# Patient Record
Sex: FEMALE | Race: OTHER | HISPANIC OR LATINO | Employment: FULL TIME | URBAN - METROPOLITAN AREA
[De-identification: names, ages, dates, MRNs, and addresses within clinical notes are randomized per-mention and may not be internally consistent; named-entity substitution may affect disease eponyms.]

---

## 2019-11-03 ENCOUNTER — OFFICE VISIT (OUTPATIENT)
Dept: URGENT CARE | Facility: CLINIC | Age: 24
End: 2019-11-03
Payer: COMMERCIAL

## 2019-11-03 VITALS
RESPIRATION RATE: 18 BRPM | OXYGEN SATURATION: 97 % | HEART RATE: 66 BPM | DIASTOLIC BLOOD PRESSURE: 83 MMHG | BODY MASS INDEX: 23.32 KG/M2 | SYSTOLIC BLOOD PRESSURE: 132 MMHG | WEIGHT: 140 LBS | HEIGHT: 65 IN | TEMPERATURE: 99 F

## 2019-11-03 DIAGNOSIS — L50.9 HIVES: Primary | ICD-10-CM

## 2019-11-03 PROCEDURE — 99213 PR OFFICE/OUTPT VISIT, EST, LEVL III, 20-29 MIN: ICD-10-PCS | Mod: S$GLB,,, | Performed by: FAMILY MEDICINE

## 2019-11-03 PROCEDURE — 99213 OFFICE O/P EST LOW 20 MIN: CPT | Mod: S$GLB,,, | Performed by: FAMILY MEDICINE

## 2019-11-03 PROCEDURE — 3008F BODY MASS INDEX DOCD: CPT | Mod: CPTII,S$GLB,, | Performed by: FAMILY MEDICINE

## 2019-11-03 PROCEDURE — 3008F PR BODY MASS INDEX (BMI) DOCUMENTED: ICD-10-PCS | Mod: CPTII,S$GLB,, | Performed by: FAMILY MEDICINE

## 2019-11-03 NOTE — PATIENT INSTRUCTIONS
Hives (Adult)  Hives are pink or red bumps on the skin. These bumps are also known as wheals. The bumps can itch, burn, or sting. Hives can occur anywhere on the body. They vary in size and shape and can form in clusters. Individual hives can appear and go away quickly. New hives may develop as old ones fade. Hives are common and usually harmless. Occasionally hives are a sign of a serious allergy.  Hives are often caused by an allergic reaction. It may be an allergic reaction to foods such as fruit, shellfish, chocolate, nuts, or tomatoes. It may be a reaction to pollens, animal fur, or mold spores. Medicines, chemicals, and insect bites can also cause hives. And hives can be caused by hot sun or cold air. The cause of hives can be difficult to find.  You may be given medicines to relieve swelling and itching. Follow all instructions when using these medicines. The hives will usually fade in a few days, but can last up to 2 weeks.  Home care  Follow these tips:  · Try to find the cause of the hives and eliminate it. Discuss possible causes with your healthcare provider. Future reactions to the same allergen may be worse.  · Dont scratch the hives. Scratching will delay healing. To reduce itching, apply cool, wet compresses to the skin.  · Dress in soft, loose cotton clothing.  · Dont bathe in hot water. This can make the itching worse.  · Apply an ice pack or cool pack wrapped in a thin towel to your skin. This will help reduce redness and itching. But if your hives were caused by exposure to cold, then do not apply more cold to them.  · You may use over-the counter antihistamines to reduce itching. Some older antihistamines, such as diphenhydramine and chlorpheniramine, are inexpensive. But they need to be taken often and may make you sleepy. They are best used at bedtime. Dont use diphenhydramine if you have glaucoma or have trouble urinating because of an enlarged prostate. Newer antihistamines, such as  loratadine, cetirizine, and fexofenadine, are generally more expensive. But they tend to have fewer side effects, such as drowsiness. They can be taken less often.  · Another type of antihistamine is used to treat heartburn. This type includes ranitidine, nizatidine, famotidine, and cimetidine. These are sometimes used along with the above antihistamines if a single medicine is not working.  Follow-up care  Follow up with your healthcare provider if your symptoms don't get better in 2 days. Ask your provider about allergy testing if you have had a severe reaction, or have had several episodes of hives. He or she can use the allergy testing to find out what you are allergic to.  When to seek medical advice  Call your healthcare provider right away if any of these occur:  · Fever of 100.4°F (38.0°C) or higher, or as directed by your healthcare provider  · Redness, swelling, or pain  · Foul-smelling fluid coming from the rash  Call 911  Call 911 if any of the following occur:  · Swelling of the face, throat, or tongue  · Trouble breathing or swallowing  · Dizziness, weakness, or fainting  Date Last Reviewed: 9/1/2016  © 8700-5078 [employer]. [address] [place] [address], [place] [address]. All rights reserved. This information is not intended as a substitute for professional medical care. Always follow your healthcare professional's instructions.

## 2019-11-03 NOTE — PROGRESS NOTES
"Subjective:       Patient ID: Loida Cloud is a 23 y.o. female.    Vitals:  height is 5' 5" (1.651 m) and weight is 63.5 kg (140 lb). Her tympanic temperature is 98.7 °F (37.1 °C). Her blood pressure is 132/83 and her pulse is 66. Her respiration is 18 and oxygen saturation is 97%.     Chief Complaint: Rash    Patient present with rash on arms and sometime on stomach and breast. Patient noticed it two weeks ago. Patient states flare up occurs everyday. Patient has tried taking Claritin which temporarily .     Rash   The current episode started 1 to 4 weeks ago. The problem has been gradually worsening since onset. The affected locations include the left arm, right arm, chest and torso. The rash is characterized by itchiness and redness. It is unknown if there was an exposure to a precipitant. Pertinent negatives include no cough, fever or sore throat. The treatment provided no relief.       Constitution: Negative for chills and fever.   HENT: Negative for facial swelling and sore throat.    Neck: Negative for painful lymph nodes.   Eyes: Negative for eye itching and eyelid swelling.   Respiratory: Negative for cough.    Musculoskeletal: Negative for joint pain and joint swelling.   Skin: Positive for rash. Negative for color change, pale, wound, abrasion, laceration, lesion, skin thickening/induration, puncture wound, erythema, bruising, abscess, avulsion and hives.   Allergic/Immunologic: Negative for environmental allergies, immunocompromised state and hives.   Hematologic/Lymphatic: Negative for swollen lymph nodes.       Objective:      Physical Exam   Constitutional: She appears well-developed and well-nourished.   HENT:   Head: Normocephalic.   Right Ear: External ear normal.   Left Ear: External ear normal.   Mouth/Throat: Oropharynx is clear and moist.   Clear rhinorrhea   Eyes: Pupils are equal, round, and reactive to light. Conjunctivae and EOM are normal.   Neck: Normal range of motion. Neck supple. "   Cardiovascular: Normal rate, regular rhythm and normal heart sounds.   Pulmonary/Chest: Effort normal.   Skin: Skin is warm and dry.   Skin is clear now but picture on her phone shows diffuse hives erythema  Nursing note and vitals reviewed.        Assessment:       1. Hives        Plan:         Hives    recommend fexofenadine daily for 14 days .   Keep a diary to see if she can figure out a cause. If not improving she may wasn't to see an allergist  Patient Instructions     Hives (Adult)  Hives are pink or red bumps on the skin. These bumps are also known as wheals. The bumps can itch, burn, or sting. Hives can occur anywhere on the body. They vary in size and shape and can form in clusters. Individual hives can appear and go away quickly. New hives may develop as old ones fade. Hives are common and usually harmless. Occasionally hives are a sign of a serious allergy.  Hives are often caused by an allergic reaction. It may be an allergic reaction to foods such as fruit, shellfish, chocolate, nuts, or tomatoes. It may be a reaction to pollens, animal fur, or mold spores. Medicines, chemicals, and insect bites can also cause hives. And hives can be caused by hot sun or cold air. The cause of hives can be difficult to find.  You may be given medicines to relieve swelling and itching. Follow all instructions when using these medicines. The hives will usually fade in a few days, but can last up to 2 weeks.  Home care  Follow these tips:  · Try to find the cause of the hives and eliminate it. Discuss possible causes with your healthcare provider. Future reactions to the same allergen may be worse.  · Dont scratch the hives. Scratching will delay healing. To reduce itching, apply cool, wet compresses to the skin.  · Dress in soft, loose cotton clothing.  · Dont bathe in hot water. This can make the itching worse.  · Apply an ice pack or cool pack wrapped in a thin towel to your skin. This will help reduce redness and  itching. But if your hives were caused by exposure to cold, then do not apply more cold to them.  · You may use over-the counter antihistamines to reduce itching. Some older antihistamines, such as diphenhydramine and chlorpheniramine, are inexpensive. But they need to be taken often and may make you sleepy. They are best used at bedtime. Dont use diphenhydramine if you have glaucoma or have trouble urinating because of an enlarged prostate. Newer antihistamines, such as loratadine, cetirizine, and fexofenadine, are generally more expensive. But they tend to have fewer side effects, such as drowsiness. They can be taken less often.  · Another type of antihistamine is used to treat heartburn. This type includes ranitidine, nizatidine, famotidine, and cimetidine. These are sometimes used along with the above antihistamines if a single medicine is not working.  Follow-up care  Follow up with your healthcare provider if your symptoms don't get better in 2 days. Ask your provider about allergy testing if you have had a severe reaction, or have had several episodes of hives. He or she can use the allergy testing to find out what you are allergic to.  When to seek medical advice  Call your healthcare provider right away if any of these occur:  · Fever of 100.4°F (38.0°C) or higher, or as directed by your healthcare provider  · Redness, swelling, or pain  · Foul-smelling fluid coming from the rash  Call 911  Call 911 if any of the following occur:  · Swelling of the face, throat, or tongue  · Trouble breathing or swallowing  · Dizziness, weakness, or fainting  Date Last Reviewed: 9/1/2016  © 5009-4657 [employer]. [address] [place] [address], [place] [address]. All rights reserved. This information is not intended as a substitute for professional medical care. Always follow your healthcare professional's instructions.

## 2021-04-06 ENCOUNTER — OFFICE VISIT (OUTPATIENT)
Dept: URGENT CARE | Facility: CLINIC | Age: 26
End: 2021-04-06
Payer: COMMERCIAL

## 2021-04-06 VITALS
WEIGHT: 150 LBS | OXYGEN SATURATION: 97 % | HEART RATE: 77 BPM | HEIGHT: 65 IN | RESPIRATION RATE: 19 BRPM | TEMPERATURE: 99 F | SYSTOLIC BLOOD PRESSURE: 117 MMHG | BODY MASS INDEX: 24.99 KG/M2 | DIASTOLIC BLOOD PRESSURE: 73 MMHG

## 2021-04-06 DIAGNOSIS — J02.9 SORE THROAT: ICD-10-CM

## 2021-04-06 DIAGNOSIS — J00 ACUTE NASOPHARYNGITIS: Primary | ICD-10-CM

## 2021-04-06 DIAGNOSIS — R05.9 COUGH: ICD-10-CM

## 2021-04-06 LAB
CTP QC/QA: YES
CTP QC/QA: YES
MOLECULAR STREP A: NEGATIVE
SARS-COV-2 RDRP RESP QL NAA+PROBE: NEGATIVE

## 2021-04-06 PROCEDURE — U0002 COVID-19 LAB TEST NON-CDC: HCPCS | Mod: QW,S$GLB,, | Performed by: SURGERY

## 2021-04-06 PROCEDURE — 3008F PR BODY MASS INDEX (BMI) DOCUMENTED: ICD-10-PCS | Mod: CPTII,S$GLB,, | Performed by: SURGERY

## 2021-04-06 PROCEDURE — 3008F BODY MASS INDEX DOCD: CPT | Mod: CPTII,S$GLB,, | Performed by: SURGERY

## 2021-04-06 PROCEDURE — 99213 OFFICE O/P EST LOW 20 MIN: CPT | Mod: S$GLB,,, | Performed by: SURGERY

## 2021-04-06 PROCEDURE — 99213 PR OFFICE/OUTPT VISIT, EST, LEVL III, 20-29 MIN: ICD-10-PCS | Mod: S$GLB,,, | Performed by: SURGERY

## 2021-04-06 PROCEDURE — 87651 STREP A DNA AMP PROBE: CPT | Mod: QW,S$GLB,, | Performed by: SURGERY

## 2021-04-06 PROCEDURE — U0002: ICD-10-PCS | Mod: QW,S$GLB,, | Performed by: SURGERY

## 2021-04-06 PROCEDURE — 87651 POCT STREP A MOLECULAR: ICD-10-PCS | Mod: QW,S$GLB,, | Performed by: SURGERY

## 2021-11-17 ENCOUNTER — OFFICE VISIT (OUTPATIENT)
Dept: URGENT CARE | Facility: CLINIC | Age: 26
End: 2021-11-17
Payer: COMMERCIAL

## 2021-11-17 VITALS
RESPIRATION RATE: 18 BRPM | OXYGEN SATURATION: 98 % | HEART RATE: 58 BPM | SYSTOLIC BLOOD PRESSURE: 114 MMHG | TEMPERATURE: 99 F | DIASTOLIC BLOOD PRESSURE: 72 MMHG

## 2021-11-17 DIAGNOSIS — N60.82 SEBACEOUS CYST OF SKIN OF BREAST, LEFT: Primary | ICD-10-CM

## 2021-11-17 PROCEDURE — 1160F RVW MEDS BY RX/DR IN RCRD: CPT | Mod: CPTII,S$GLB,, | Performed by: NURSE PRACTITIONER

## 2021-11-17 PROCEDURE — 1159F MED LIST DOCD IN RCRD: CPT | Mod: CPTII,S$GLB,, | Performed by: NURSE PRACTITIONER

## 2021-11-17 PROCEDURE — 1160F PR REVIEW ALL MEDS BY PRESCRIBER/CLIN PHARMACIST DOCUMENTED: ICD-10-PCS | Mod: CPTII,S$GLB,, | Performed by: NURSE PRACTITIONER

## 2021-11-17 PROCEDURE — 3074F SYST BP LT 130 MM HG: CPT | Mod: CPTII,S$GLB,, | Performed by: NURSE PRACTITIONER

## 2021-11-17 PROCEDURE — 3078F PR MOST RECENT DIASTOLIC BLOOD PRESSURE < 80 MM HG: ICD-10-PCS | Mod: CPTII,S$GLB,, | Performed by: NURSE PRACTITIONER

## 2021-11-17 PROCEDURE — 1159F PR MEDICATION LIST DOCUMENTED IN MEDICAL RECORD: ICD-10-PCS | Mod: CPTII,S$GLB,, | Performed by: NURSE PRACTITIONER

## 2021-11-17 PROCEDURE — 99213 PR OFFICE/OUTPT VISIT, EST, LEVL III, 20-29 MIN: ICD-10-PCS | Mod: S$GLB,,, | Performed by: NURSE PRACTITIONER

## 2021-11-17 PROCEDURE — 3074F PR MOST RECENT SYSTOLIC BLOOD PRESSURE < 130 MM HG: ICD-10-PCS | Mod: CPTII,S$GLB,, | Performed by: NURSE PRACTITIONER

## 2021-11-17 PROCEDURE — 99213 OFFICE O/P EST LOW 20 MIN: CPT | Mod: S$GLB,,, | Performed by: NURSE PRACTITIONER

## 2021-11-17 PROCEDURE — 3078F DIAST BP <80 MM HG: CPT | Mod: CPTII,S$GLB,, | Performed by: NURSE PRACTITIONER

## 2021-11-17 RX ORDER — SULFAMETHOXAZOLE AND TRIMETHOPRIM 800; 160 MG/1; MG/1
1 TABLET ORAL 2 TIMES DAILY
Qty: 14 TABLET | Refills: 0 | Status: SHIPPED | OUTPATIENT
Start: 2021-11-17 | End: 2021-11-24